# Patient Record
Sex: FEMALE | Race: WHITE | NOT HISPANIC OR LATINO | URBAN - METROPOLITAN AREA
[De-identification: names, ages, dates, MRNs, and addresses within clinical notes are randomized per-mention and may not be internally consistent; named-entity substitution may affect disease eponyms.]

---

## 2023-02-28 ENCOUNTER — TELEPHONE ENCOUNTER (OUTPATIENT)
Dept: URBAN - METROPOLITAN AREA CLINIC 94 | Facility: CLINIC | Age: 43
End: 2023-02-28

## 2024-04-03 ENCOUNTER — OV NP (OUTPATIENT)
Dept: URBAN - METROPOLITAN AREA CLINIC 52 | Facility: CLINIC | Age: 44
End: 2024-04-03

## 2024-04-03 RX ORDER — PANTOPRAZOLE SODIUM 40 MG/1
1 TABLET TABLET, DELAYED RELEASE ORAL ONCE A DAY
Status: ACTIVE | COMMUNITY

## 2024-04-03 RX ORDER — ONDANSETRON 8 MG/1
TABLET, ORALLY DISINTEGRATING ORAL
Qty: 30 TABLET | Status: ON HOLD | COMMUNITY

## 2024-04-03 RX ORDER — SUCRALFATE 1 G/1
TABLET ORAL
Qty: 120 TABLET | Status: ON HOLD | COMMUNITY

## 2024-04-03 RX ORDER — SPIRONOLACTONE 25 MG/1
1 TABLET TABLET, FILM COATED ORAL
Status: ACTIVE | COMMUNITY

## 2024-05-31 ENCOUNTER — DASHBOARD ENCOUNTERS (OUTPATIENT)
Age: 44
End: 2024-05-31

## 2024-06-04 ENCOUNTER — OFFICE VISIT (OUTPATIENT)
Dept: URBAN - METROPOLITAN AREA CLINIC 52 | Facility: CLINIC | Age: 44
End: 2024-06-04
Payer: COMMERCIAL

## 2024-06-04 VITALS
TEMPERATURE: 97 F | BODY MASS INDEX: 17.01 KG/M2 | WEIGHT: 96 LBS | HEIGHT: 63 IN | HEART RATE: 141 BPM | DIASTOLIC BLOOD PRESSURE: 88 MMHG | SYSTOLIC BLOOD PRESSURE: 125 MMHG | OXYGEN SATURATION: 98 %

## 2024-06-04 DIAGNOSIS — R11.2 NAUSEA AND VOMITING, UNSPECIFIED VOMITING TYPE: ICD-10-CM

## 2024-06-04 DIAGNOSIS — K70.0 FATTY LIVER DUE TO ALCOHOLISM: ICD-10-CM

## 2024-06-04 DIAGNOSIS — K80.20 SYMPTOMATIC CHOLELITHIASIS: ICD-10-CM

## 2024-06-04 PROCEDURE — 99204 OFFICE O/P NEW MOD 45 MIN: CPT | Performed by: INTERNAL MEDICINE

## 2024-06-04 RX ORDER — SUCRALFATE 1 G/1
TABLET ORAL
Qty: 120 TABLET | Status: ON HOLD | COMMUNITY

## 2024-06-04 RX ORDER — ONDANSETRON 8 MG/1
TABLET, ORALLY DISINTEGRATING ORAL
Qty: 30 TABLET | Status: ON HOLD | COMMUNITY

## 2024-06-04 RX ORDER — PANTOPRAZOLE SODIUM 40 MG/1
1 TABLET TABLET, DELAYED RELEASE ORAL ONCE A DAY
Status: ON HOLD | COMMUNITY

## 2024-06-04 RX ORDER — SPIRONOLACTONE 25 MG/1
1 TABLET TABLET, FILM COATED ORAL
Status: ON HOLD | COMMUNITY

## 2024-06-04 NOTE — HPI-TODAY'S VISIT:
43 y.o. female with h/o juvenile polyposis syndrome, alcohol abuse, alcoholic fatty liver disease, gallstones, and recurrent intractable nausea/vomiting who presents to office for gallstones. She currently follows Dr. Olivares or [facility] and has had a thorough work up. She was referred to general surgeon by Dr. Olivares' office, but states she was never contacted by the surgeon. Fibroscan and HIDA ordered by Dr. Olivares' office, but patient did not have these tests.   Last seen by Dr. Olivares on 5/9/24.  Currently, denies abdominal pain. States nausea and vomiting controlled with Zofran. She continues to drink at least a bottle of wine daily.  Abdominal ultrasound 4/26/24 with stone filled gallbladder with sonographic Deleon sign and pericholecystic fluid; also with hepatic steatosis.   CT A/P 5/3/24 revealed hepatomegaly and severe steatosis; multiple gallstones with no distention of gallbladder, no pericholecystic inflammation; and diverticulosis.  ETOH level on 5/9/24 was 331.  EGD 2/16/24 (Dr. Olivares) with mildly severe esophagitis and gastritis, with negative biopsies.  Colonoscopy 9/13/2019 2-mm cecal polyp (hyperplastic), diverticulosis AC, and IH. It was recommended patient repeat colonoscopy in 1 year.

## 2024-06-09 ENCOUNTER — TELEPHONE ENCOUNTER (OUTPATIENT)
Dept: URBAN - METROPOLITAN AREA CLINIC 94 | Facility: CLINIC | Age: 44
End: 2024-06-09

## 2024-06-09 PROBLEM — 50325005: Status: ACTIVE | Noted: 2024-06-09

## 2024-06-09 PROBLEM — 266474003: Status: ACTIVE | Noted: 2024-06-09
